# Patient Record
Sex: FEMALE | Race: WHITE | NOT HISPANIC OR LATINO | Employment: OTHER | URBAN - METROPOLITAN AREA
[De-identification: names, ages, dates, MRNs, and addresses within clinical notes are randomized per-mention and may not be internally consistent; named-entity substitution may affect disease eponyms.]

---

## 2017-02-03 ENCOUNTER — ALLSCRIPTS OFFICE VISIT (OUTPATIENT)
Dept: OTHER | Facility: OTHER | Age: 72
End: 2017-02-03

## 2017-12-13 ENCOUNTER — ALLSCRIPTS OFFICE VISIT (OUTPATIENT)
Dept: OTHER | Facility: OTHER | Age: 72
End: 2017-12-13
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
## 2018-01-12 VITALS
BODY MASS INDEX: 26.12 KG/M2 | WEIGHT: 153 LBS | RESPIRATION RATE: 14 BRPM | SYSTOLIC BLOOD PRESSURE: 134 MMHG | HEART RATE: 80 BPM | HEIGHT: 64 IN | OXYGEN SATURATION: 92 % | DIASTOLIC BLOOD PRESSURE: 72 MMHG

## 2018-01-13 NOTE — MISCELLANEOUS
Provider Comments  Provider Comments:   1st No show for Neurology: No call or message received  I called the patient and she stated that she completely forgot about the appointment due to a death in the family  Rescheduled patient to 2/3/17  Mailing 1st no show letter to the patients home today  Melinda Greer MD    Electronically signed by :  Sully Gonzalez DO; Dec 15 2016  2:48PM EST                       (Author)

## 2018-01-22 VITALS
HEART RATE: 78 BPM | SYSTOLIC BLOOD PRESSURE: 118 MMHG | BODY MASS INDEX: 26.46 KG/M2 | RESPIRATION RATE: 14 BRPM | DIASTOLIC BLOOD PRESSURE: 78 MMHG | HEIGHT: 64 IN | WEIGHT: 155 LBS

## 2018-02-07 DIAGNOSIS — M79.18 MUSCLE PAIN, LUMBAR: Primary | ICD-10-CM

## 2018-02-07 RX ORDER — MELOXICAM 7.5 MG/1
1 TABLET ORAL DAILY
COMMUNITY
Start: 2015-10-01 | End: 2018-02-07 | Stop reason: SDUPTHER

## 2018-02-07 RX ORDER — MELOXICAM 7.5 MG/1
7.5 TABLET ORAL DAILY
Qty: 90 TABLET | Refills: 3 | Status: SHIPPED | OUTPATIENT
Start: 2018-02-07 | End: 2019-04-29

## 2018-04-08 DIAGNOSIS — Z86.69 HISTORY OF MIGRAINE HEADACHES: Primary | ICD-10-CM

## 2018-04-09 RX ORDER — PROPRANOLOL HYDROCHLORIDE 120 MG/1
CAPSULE, EXTENDED RELEASE ORAL
Qty: 90 CAPSULE | Refills: 3 | Status: SHIPPED | OUTPATIENT
Start: 2018-04-09 | End: 2019-03-30 | Stop reason: SDUPTHER

## 2019-03-30 DIAGNOSIS — Z86.69 HISTORY OF MIGRAINE HEADACHES: ICD-10-CM

## 2019-04-01 RX ORDER — PROPRANOLOL HYDROCHLORIDE 120 MG/1
CAPSULE, EXTENDED RELEASE ORAL
Qty: 90 CAPSULE | Refills: 0 | Status: SHIPPED | OUTPATIENT
Start: 2019-04-01 | End: 2020-05-29 | Stop reason: ALTCHOICE

## 2019-04-29 ENCOUNTER — OFFICE VISIT (OUTPATIENT)
Dept: NEUROLOGY | Facility: CLINIC | Age: 74
End: 2019-04-29
Payer: MEDICARE

## 2019-04-29 VITALS
HEART RATE: 76 BPM | SYSTOLIC BLOOD PRESSURE: 128 MMHG | BODY MASS INDEX: 31.02 KG/M2 | DIASTOLIC BLOOD PRESSURE: 82 MMHG | WEIGHT: 158 LBS | HEIGHT: 60 IN

## 2019-04-29 DIAGNOSIS — R51.9 OCULAR HEADACHE: ICD-10-CM

## 2019-04-29 DIAGNOSIS — F41.9 ANXIETY: ICD-10-CM

## 2019-04-29 DIAGNOSIS — G52.1 GLOSSOPHARYNGEAL NEURALGIA SYNDROME: Primary | ICD-10-CM

## 2019-04-29 DIAGNOSIS — F41.9 ANXIETY: Primary | ICD-10-CM

## 2019-04-29 PROCEDURE — 99214 OFFICE O/P EST MOD 30 MIN: CPT | Performed by: NURSE PRACTITIONER

## 2019-04-29 RX ORDER — METRONIDAZOLE 10 MG/G
GEL TOPICAL
COMMUNITY
Start: 2013-08-23

## 2019-04-29 RX ORDER — ALPRAZOLAM 0.25 MG/1
0.25 TABLET ORAL
Qty: 20 TABLET | Refills: 0 | Status: SHIPPED | OUTPATIENT
Start: 2019-04-29 | End: 2020-05-29 | Stop reason: ALTCHOICE

## 2019-04-29 RX ORDER — ALBUTEROL SULFATE 90 UG/1
2 AEROSOL, METERED RESPIRATORY (INHALATION) EVERY 4 HOURS PRN
COMMUNITY

## 2019-04-29 RX ORDER — OMEPRAZOLE 40 MG/1
CAPSULE, DELAYED RELEASE ORAL
Refills: 1 | COMMUNITY
Start: 2019-04-15

## 2019-04-29 RX ORDER — ESOMEPRAZOLE MAGNESIUM 40 MG/1
CAPSULE, DELAYED RELEASE ORAL
COMMUNITY

## 2019-04-29 RX ORDER — SIMVASTATIN 40 MG
TABLET ORAL
COMMUNITY
Start: 2014-10-20 | End: 2021-12-07 | Stop reason: SDUPTHER

## 2019-04-29 RX ORDER — VENLAFAXINE HYDROCHLORIDE 75 MG/1
CAPSULE, EXTENDED RELEASE ORAL
COMMUNITY
Start: 2019-02-09 | End: 2021-12-07 | Stop reason: SDUPTHER

## 2019-04-29 RX ORDER — OMEPRAZOLE 40 MG/1
CAPSULE, DELAYED RELEASE ORAL
COMMUNITY
Start: 2018-11-09 | End: 2021-12-07 | Stop reason: SDUPTHER

## 2019-04-29 RX ORDER — SIMVASTATIN 40 MG
40 TABLET ORAL DAILY
Refills: 1 | COMMUNITY
Start: 2019-03-17

## 2019-04-29 RX ORDER — VENLAFAXINE HYDROCHLORIDE 75 MG/1
CAPSULE, EXTENDED RELEASE ORAL
COMMUNITY
Start: 2018-11-09

## 2019-10-31 ENCOUNTER — TELEPHONE (OUTPATIENT)
Dept: NEUROLOGY | Facility: CLINIC | Age: 74
End: 2019-10-31

## 2019-10-31 NOTE — TELEPHONE ENCOUNTER
Provider reschedule - patient moved from 4/29/20 to 5/29/20 @ 10:30 - [facility] - letter scanned into chart and sent in mail

## 2020-05-27 ENCOUNTER — TELEPHONE (OUTPATIENT)
Dept: NEUROLOGY | Facility: CLINIC | Age: 75
End: 2020-05-27

## 2020-05-29 ENCOUNTER — OFFICE VISIT (OUTPATIENT)
Dept: NEUROLOGY | Facility: CLINIC | Age: 75
End: 2020-05-29
Payer: MEDICARE

## 2020-05-29 VITALS
SYSTOLIC BLOOD PRESSURE: 142 MMHG | RESPIRATION RATE: 16 BRPM | HEART RATE: 70 BPM | WEIGHT: 157.4 LBS | BODY MASS INDEX: 30.74 KG/M2 | OXYGEN SATURATION: 95 % | TEMPERATURE: 98.2 F | DIASTOLIC BLOOD PRESSURE: 78 MMHG

## 2020-05-29 DIAGNOSIS — G52.1 GLOSSOPHARYNGEAL NEURALGIA SYNDROME: ICD-10-CM

## 2020-05-29 DIAGNOSIS — R51.9 OCULAR HEADACHE: Primary | ICD-10-CM

## 2020-05-29 DIAGNOSIS — F41.9 ANXIETY: ICD-10-CM

## 2020-05-29 PROCEDURE — 99213 OFFICE O/P EST LOW 20 MIN: CPT | Performed by: PSYCHIATRY & NEUROLOGY

## 2020-05-29 RX ORDER — UREA 10 %
1 LOTION (ML) TOPICAL EVERY 24 HOURS
COMMUNITY
Start: 2013-03-19

## 2020-05-29 RX ORDER — AMOXICILLIN 500 MG
CAPSULE ORAL
COMMUNITY
Start: 2013-03-19

## 2020-05-29 RX ORDER — METOPROLOL SUCCINATE 50 MG/1
50 TABLET, EXTENDED RELEASE ORAL DAILY
COMMUNITY
Start: 2020-05-21 | End: 2022-06-06

## 2021-05-27 ENCOUNTER — TELEPHONE (OUTPATIENT)
Dept: NEUROLOGY | Facility: CLINIC | Age: 76
End: 2021-05-27

## 2021-05-27 NOTE — TELEPHONE ENCOUNTER
I called and left a voicemail message about patients upcoming appointment with Corina Carroll on 6/2/21 @ 10:30 am  I requested a call back to our office if the patient has any issues or concerns or cannot keep this appointment

## 2021-06-02 ENCOUNTER — OFFICE VISIT (OUTPATIENT)
Dept: NEUROLOGY | Facility: CLINIC | Age: 76
End: 2021-06-02
Payer: MEDICARE

## 2021-06-02 VITALS
HEIGHT: 61 IN | HEART RATE: 96 BPM | SYSTOLIC BLOOD PRESSURE: 132 MMHG | DIASTOLIC BLOOD PRESSURE: 84 MMHG | BODY MASS INDEX: 30.17 KG/M2 | WEIGHT: 159.8 LBS | RESPIRATION RATE: 16 BRPM

## 2021-06-02 DIAGNOSIS — R51.9 OCULAR HEADACHE: ICD-10-CM

## 2021-06-02 DIAGNOSIS — G52.1 GLOSSOPHARYNGEAL NEURALGIA SYNDROME: ICD-10-CM

## 2021-06-02 DIAGNOSIS — S03.00XA TMJ (DISLOCATION OF TEMPOROMANDIBULAR JOINT): Primary | ICD-10-CM

## 2021-06-02 PROCEDURE — 99214 OFFICE O/P EST MOD 30 MIN: CPT | Performed by: PSYCHIATRY & NEUROLOGY

## 2021-06-02 NOTE — ASSESSMENT & PLAN NOTE
She does have a history of glossopharyngeal neuralgia which has significantly improved I wonder if these new symptoms may be a component of this or component of migraine  Another possibility is adding a low dose of gabapentin which may help both in the future    She is to contact our offices accordingly in several weeks after she has pursued conservative measures and further medications could be adjusted
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
## 2021-06-02 NOTE — ASSESSMENT & PLAN NOTE
sHe does have a history of ocular migraines and these current occur intermittently  She does now complain of achy throbbing sensation around her ear with pain which can radiate to below right ear  She has been previously suggested that she may have TMJ and she was asked to obtain a mouth guard  I did recommend a mouth guard  I also recommended that she pursue physical therapy with exercise  She was quite reluctant  I have informed her that this can be quite helpful  If these  Are unsuccessful we could also consider slightly increasing the Effexor  I would not add another beta-blocker she is already on one and has a pacemaker

## 2021-06-07 ENCOUNTER — EVALUATION (OUTPATIENT)
Dept: PHYSICAL THERAPY | Facility: REHABILITATION | Age: 76
End: 2021-06-07
Payer: MEDICARE

## 2021-06-07 DIAGNOSIS — S03.00XA DISLOCATION OF TEMPOROMANDIBULAR JOINT, INITIAL ENCOUNTER: ICD-10-CM

## 2021-06-07 DIAGNOSIS — S03.00XA TMJ (DISLOCATION OF TEMPOROMANDIBULAR JOINT): ICD-10-CM

## 2021-06-07 PROCEDURE — 97110 THERAPEUTIC EXERCISES: CPT | Performed by: PHYSICAL MEDICINE & REHABILITATION

## 2021-06-07 PROCEDURE — 97161 PT EVAL LOW COMPLEX 20 MIN: CPT | Performed by: PHYSICAL MEDICINE & REHABILITATION

## 2021-06-07 NOTE — PROGRESS NOTES
PT Evaluation     Today's date: 2021  Patient name: Alexander Francis  : 1945  MRN: [id number]  Referring provider: Rosie Estrada DO  Dx:   Encounter Diagnosis     ICD-10-CM    1  TMJ (dislocation of temporomandibular joint)  S03  00XA Ambulatory referral to Physical Therapy   2  Dislocation of temporomandibular joint, initial encounter  S03  00XA        Start Time: 3537  Stop Time: 1600  Total time in clinic (min): 45 minutes    Assessment  Assessment details: Pt is a 68 y o  female presenting to outpatient physical therapy with TMJ (dislocation of temporomandibular joint)   Pt presents with pain, decreased range of motion, decreased strength, and decreased tolerance to activity  Pt would benefit from skilled physical therapy to address limitations and to achieve goals  Thank you for this referral    Impairments: abnormal muscle tone, impaired physical strength and lacks appropriate home exercise program    Symptom irritability: highUnderstanding of Dx/Px/POC: good   Prognosis: good    Goals  ST  Patient will report 25% decrease in pain in 4 weeks  2  Patient will demonstrate 25% improvement in ROM in 4 weeks  3  Patient will demonstrate 1/2 grade improvement in strength in 4 weeks  LT  Patient will be able to perform IADLS without restriction or pain by discharge  2  Patient will be independent in HEP by discharge  3  Patient will report reduction in frequency and intensity of pain by DC         Plan  Patient would benefit from: skilled physical therapy and PT eval  Planned modality interventions: biofeedback, cryotherapy and thermotherapy: hydrocollator packs  Planned therapy interventions: abdominal trunk stabilization, joint mobilization, manual therapy, neuromuscular re-education, patient education, postural training, strengthening, stretching, therapeutic exercise, therapeutic activities and home exercise program  Frequency: 2x week  Duration in weeks: 4  Treatment plan discussed with: patient        Subjective Evaluation    History of Present Illness  Mechanism of injury: Pt states that her sxs began approximately 1 year ago, she reports that her HA sxs occur once a month  She has experienced tongue numbness, ear and jaw pain  She occasionally will experience intense pain that will last for a few seconds  She also reports intermittent sensation of tongue swelling  Pain  Current pain ratin  At best pain ratin  At worst pain ratin  Quality: sharp  Progression: improved    Patient Goals  Patient goals for therapy: decreased pain          Objective     Palpation     Right   Hypertonic in the masseter  Tenderness of the masseter and temporalis     TMJ   Jaw observations: facial symmetry within normal limits  Joint sounds left: normal  Joint sounds right: normal  Lateral bite test, Left: no pain  Lateral bite test, right: pain on right  Opening (mm): within normal limits   Lateral excursion, left (mm): within normal limits  Lateral excursion, right (mm)t: within normal limits   Protrusion (mm): within normal limits       Flowsheet Rows      Most Recent Value   PT/OT G-Codes   Current Score  63   Projected Score  65             Precautions: Hx of ocular headache, anxiety      Manuals             R masseter STM             Assess need for joint mobilizations                                       Neuro Re-Ed                                                                                                        Ther Ex             UBE             Protrusion iso             Lateral deviation iso B             TB row             TB horizontal ABD             Corner stretch             Open books                          Ther Activity                                       Gait Training                                       Modalities

## 2021-06-15 ENCOUNTER — OFFICE VISIT (OUTPATIENT)
Dept: PHYSICAL THERAPY | Facility: REHABILITATION | Age: 76
End: 2021-06-15
Payer: MEDICARE

## 2021-06-15 DIAGNOSIS — S03.00XD DISLOCATION OF TEMPOROMANDIBULAR JOINT, SUBSEQUENT ENCOUNTER: Primary | ICD-10-CM

## 2021-06-15 PROCEDURE — 97110 THERAPEUTIC EXERCISES: CPT

## 2021-06-15 PROCEDURE — 97140 MANUAL THERAPY 1/> REGIONS: CPT

## 2021-06-15 NOTE — PROGRESS NOTES
Daily Note     Today's date: 6/15/2021  Patient name: Juan A Preciado  : 1945  MRN: [id number]  Referring provider: Flower Mcclure DO  Dx:   Encounter Diagnosis     ICD-10-CM    1  Dislocation of temporomandibular joint, subsequent encounter  S03  00XD                   Subjective:  Patient reports compliance with performing HEP without issue  Patient denied pain upon entry  Objective: See treatment diary below  Issued a written HEP for corner pec stretch  Assessment: Tolerated treatment well  Introduced postural strengthening with good patient tolerance  Patient has a pre exiting R shoulder injury and was encouraged to perform ex within her tolerance and to modify pec corner stretch arm height on the R if needed  Patient denied pain during ex performed  Patient exhibited good technique with therapeutic exercises and would benefit from continued PT to attain set goals  Plan: Continue per plan of care        Precautions: Hx of ocular headache, anxiety      Manuals 6/15            R masseter STM NT            Assess need for joint mobilizations                                       Neuro Re-Ed                                                                                                        Ther Ex             UBE 2'/2'            Protrusion iso 5"x10            Lateral deviation iso B 5"x10            TB row YTB 3"x15            TB horizontal ABD YTB 3" x15            Corner stretch 30"x3            Open books                          Ther Activity                                       Gait Training                                       Modalities Complex Repair Preamble Text (Leave Blank If You Do Not Want): Extensive wide undermining was performed.

## 2021-06-17 ENCOUNTER — OFFICE VISIT (OUTPATIENT)
Dept: PHYSICAL THERAPY | Facility: REHABILITATION | Age: 76
End: 2021-06-17
Payer: MEDICARE

## 2021-06-17 DIAGNOSIS — S03.00XD DISLOCATION OF TEMPOROMANDIBULAR JOINT, SUBSEQUENT ENCOUNTER: Primary | ICD-10-CM

## 2021-06-17 PROCEDURE — 97140 MANUAL THERAPY 1/> REGIONS: CPT | Performed by: PHYSICAL MEDICINE & REHABILITATION

## 2021-06-17 PROCEDURE — 97110 THERAPEUTIC EXERCISES: CPT | Performed by: PHYSICAL MEDICINE & REHABILITATION

## 2021-06-17 NOTE — PROGRESS NOTES
Daily Note     Today's date: 2021  Patient name: Elvia Mcintyre  : 1945  MRN: [id number]  Referring provider: Shane Harry DO  Dx:   Encounter Diagnosis     ICD-10-CM    1  Dislocation of temporomandibular joint, subsequent encounter  S03  00XD        Start Time: 1030  Stop Time: 1115  Total time in clinic (min): 45 minutes    Subjective: Pt states that there has been no change in her condition, she had one episode of shooting pain since last visit  Objective: See treatment diary below      Assessment: Tolerated treatment well  Patient demonstrated fatigue post treatment, exhibited good technique with therapeutic exercises and would benefit from continued PT  TTP R SCM origin and into the muscle belly  Plan: Progress treatment as tolerated         Precautions: Hx of ocular headache, anxiety      Manuals 6/15 6/17           R masseter STM NC NC  + SCM           Assess need for joint mobilizations             Seated SCM stretch  NC                        Neuro Re-Ed                                                                                                        Ther Ex             UBE 2'/2' 2'/2'           Protrusion iso 5"x10            Lateral deviation iso B 5"x10            TB row YTB 3"x15 YTB  20           TB horizontal ABD YTB 3" x15 YTB  15           Corner stretch 30"x3 5 x 20"           Open books                          Ther Activity                                       Gait Training                                       Modalities

## 2021-06-22 ENCOUNTER — OFFICE VISIT (OUTPATIENT)
Dept: PHYSICAL THERAPY | Facility: REHABILITATION | Age: 76
End: 2021-06-22
Payer: MEDICARE

## 2021-06-22 DIAGNOSIS — S03.00XD DISLOCATION OF TEMPOROMANDIBULAR JOINT, SUBSEQUENT ENCOUNTER: Primary | ICD-10-CM

## 2021-06-22 PROCEDURE — 97140 MANUAL THERAPY 1/> REGIONS: CPT | Performed by: PHYSICAL MEDICINE & REHABILITATION

## 2021-06-22 PROCEDURE — 97110 THERAPEUTIC EXERCISES: CPT | Performed by: PHYSICAL MEDICINE & REHABILITATION

## 2021-06-22 NOTE — PROGRESS NOTES
Daily Note     Today's date: 2021  Patient name: Claribel Camargo  : 1945  MRN: [id number]  Referring provider: Yulissa Silva DO  Dx:   Encounter Diagnosis     ICD-10-CM    1  Dislocation of temporomandibular joint, subsequent encounter  S03  00XD        Start Time: 0945  Stop Time: 1030  Total time in clinic (min): 45 minutes    Subjective: Pt reports reduction in sxs following last visit, she denies sxs currently  Objective: See treatment diary below      Assessment: Tolerated treatment well  Patient demonstrated fatigue post treatment, exhibited good technique with therapeutic exercises and would benefit from continued PT  TTP  R SCM muscle belly  Improved L cervical rotation and SB following tx  Plan: Progress treatment as tolerated         Precautions: Hx of ocular headache, anxiety      Manuals 6/15 6/17 6/22          R masseter STM NC NC  + SCM NC  SCM only          Assess need for joint mobilizations              SCM stretch  NC  seated NC  supine                       Neuro Re-Ed                                                                                                        Ther Ex             UBE 2'/2' 2'/2' 3'/3'          Protrusion iso 5"x10            Lateral deviation iso B 5"x10            TB row YTB 3"x15 YTB  20           TB horizontal ABD YTB 3" x15 YTB  15           Corner stretch 30"x3 5 x 20"           UT stretch    3 x 30"          Open books   10 x 5"          Supine pec stretch    2 x 60"          Ther Activity                                       Gait Training                                       Modalities

## 2021-11-19 ENCOUNTER — TELEPHONE (OUTPATIENT)
Dept: NEUROLOGY | Facility: CLINIC | Age: 76
End: 2021-11-19

## 2021-11-19 NOTE — TELEPHONE ENCOUNTER
"Called pt's PCP, office staff said the doctor hasn't signed off on the office visit note yet. But once she does we should see it in her chart ( Care Everywhere)    Called pt to get more info on her numbness issue.    She stated she had it \" for a few days and it went away. It's fine now.\" Denies any difficulty swallowing or any other symptoms at this time.    Doesn't wish to come in earlier than her Dec. Appt.    I instructed her to call us if the numbness comes back or worsens or any new symptoms.    Pt agreed.    "

## 2021-11-19 NOTE — TELEPHONE ENCOUNTER
Please get the records from the pcp    She has an appointment with My in December. If Terry has any opening prior , please offer her an appointment

## 2021-11-19 NOTE — TELEPHONE ENCOUNTER
Clementine'd call from pt's PCP's office. Pt was there for a visit today for her Right sided sciatica.    RN said PCP asked to call our office to inform you pt reported at today's visit that she has intermittent Numbness of her tongue and wanted to make Neurology aware.    If you wish to speak with PCP  Dr. Palma Mosqueda    [facility]# [phone]

## 2021-12-07 ENCOUNTER — OFFICE VISIT (OUTPATIENT)
Dept: NEUROLOGY | Facility: CLINIC | Age: 76
End: 2021-12-07
Payer: MEDICARE

## 2021-12-07 VITALS
WEIGHT: 160.4 LBS | BODY MASS INDEX: 30.29 KG/M2 | SYSTOLIC BLOOD PRESSURE: 124 MMHG | HEART RATE: 74 BPM | DIASTOLIC BLOOD PRESSURE: 67 MMHG | TEMPERATURE: 97.2 F | HEIGHT: 61 IN

## 2021-12-07 DIAGNOSIS — R51.9 OCULAR HEADACHE: ICD-10-CM

## 2021-12-07 DIAGNOSIS — R20.2 PARESTHESIA: ICD-10-CM

## 2021-12-07 DIAGNOSIS — G52.1 GLOSSOPHARYNGEAL NEURALGIA SYNDROME: Primary | ICD-10-CM

## 2021-12-07 PROCEDURE — 99214 OFFICE O/P EST MOD 30 MIN: CPT | Performed by: NURSE PRACTITIONER

## 2022-04-25 ENCOUNTER — HOSPITAL ENCOUNTER (OUTPATIENT)
Dept: RADIOLOGY | Facility: IMAGING CENTER | Age: 77
Discharge: HOME | End: 2022-04-25
Payer: MEDICARE

## 2022-04-25 DIAGNOSIS — J45.991 COUGH VARIANT ASTHMA: ICD-10-CM

## 2022-04-25 PROCEDURE — 71046 X-RAY EXAM CHEST 2 VIEWS: CPT

## 2022-05-17 ENCOUNTER — TELEPHONE (OUTPATIENT)
Dept: NEUROLOGY | Facility: CLINIC | Age: 77
End: 2022-05-17

## 2022-05-17 NOTE — TELEPHONE ENCOUNTER
Called and spoke to patient  Patient confirmed upcoming apt with Dr Ever Sahni on 6/6/22 @ 10:30 am  Patient has no issues or concerns at this time

## 2022-06-06 ENCOUNTER — OFFICE VISIT (OUTPATIENT)
Dept: NEUROLOGY | Facility: CLINIC | Age: 77
End: 2022-06-06
Payer: MEDICARE

## 2022-06-06 VITALS
HEIGHT: 61 IN | WEIGHT: 162 LBS | TEMPERATURE: 95.2 F | DIASTOLIC BLOOD PRESSURE: 88 MMHG | HEART RATE: 70 BPM | RESPIRATION RATE: 18 BRPM | SYSTOLIC BLOOD PRESSURE: 132 MMHG | OXYGEN SATURATION: 97 % | BODY MASS INDEX: 30.58 KG/M2

## 2022-06-06 DIAGNOSIS — G52.1 GLOSSOPHARYNGEAL NEURALGIA SYNDROME: Primary | ICD-10-CM

## 2022-06-06 PROCEDURE — 99213 OFFICE O/P EST LOW 20 MIN: CPT | Performed by: PSYCHIATRY & NEUROLOGY

## 2022-06-06 NOTE — PROGRESS NOTES
Patient ID: Jose Alamo is a 68 y o  female  Assessment/Plan:    Glossopharyngeal neuralgia syndrome  She has a known history of glossopharyngeal neuralgia  She is currently on Effexor 75 mg a day and Toprol which is managed by cardiologist   With acupuncture treatments treatments her symptoms have improved    At this point will not change any of her medications  She can return to our offices in one year    Ocular headache  Well controlled       Diagnoses and all orders for this visit:    Glossopharyngeal neuralgia syndrome    Other orders  -     Multiple Vitamins-Minerals (VITAMIN D3 COMPLETE PO); Take by mouth daily  -     MAGNESIUM PO; Take by mouth daily         Subjective:    Doug Wang is a 60-year-old female who presents today for neurologic follow-up for ocular migraines and glossopharyngeal neuralgia       She is doing well  She does have a history of ocular may migraines are to well controlled  At the last visit several months ago she was complaining of neuralgia type symptoms occurred two or 3 times a week occurring on the right side of her head with numbness of her tongue  It was felt that the numbness of the tongue can related to the neuralgia  She has undergone acupuncture which has helped to reduce the intensity  The symptoms can occur but they do not proceed to be in increase in intensity       Physical therapy was treatment of TMJ provided no benefit            Patient presents today with complaints of numbness and tingling in her hands bilaterally, more prominent on the left  This seems to occurred when she is using her iPad as well as when she is on the motorcycle  She does note some dropping of objects  No hand weakness         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  Patient noted to have PAF and near-syncope, Holter noted AFib/flutter, she had PPM placed with no recurrence  She remains on Toprol as well as Eliquis                The following portions of the patient's history were reviewed and updated as appropriate:   She  has no past medical history on file  She  has no past surgical history on file  Her family history includes Cancer in her mother; Emphysema in her brother and father  She  reports that she has never smoked  She has never used smokeless tobacco  She reports that she does not drink alcohol and does not use drugs  Current Outpatient Medications   Medication Sig Dispense Refill    albuterol (PROVENTIL HFA,VENTOLIN HFA) 90 mcg/act inhaler Inhale 2 puffs every 4 (four) hours as needed      apixaban (ELIQUIS) 5 mg Take 5 mg by mouth 2 (two) times a day      esomeprazole (NexIUM) 40 MG capsule Take by mouth      fluticasone (ARNUITY ELLIPTA) 200 MCG/ACT AEPB inhaler Inhale      MAGNESIUM PO Take by mouth daily      metoprolol succinate (TOPROL-XL) 50 mg 24 hr tablet Take 50 mg by mouth daily      metroNIDAZOLE (METROGEL) 1 % gel use as directed      Multiple Vitamins-Minerals (VITAMIN D3 COMPLETE PO) Take by mouth daily      Omega-3 Fatty Acids (FISH OIL) 1200 MG CAPS 1 po every day      omeprazole (PriLOSEC) 40 MG capsule TAKE ONE CAPSULE BY MOUTH EVERY DAY AS NEEDED FOR ACID REFLUX  1    simvastatin (ZOCOR) 40 mg tablet Take 40 mg by mouth daily  1    venlafaxine (EFFEXOR-XR) 75 mg 24 hr capsule TAKE 1 CAPSULE NIGHTLY      VITAMIN D PO take 1 by Oral route  every day      folic acid (FOLVITE) 172 MCG tablet 1 tablet every 24 hours (Patient not taking: No sig reported)       No current facility-administered medications for this visit  She has No Known Allergies            Objective:    Blood pressure 132/88, pulse 70, temperature (!) 95 2 °F (35 1 °C), temperature source Tympanic, resp  rate 18, height 5' 1" (1 549 m), weight 73 5 kg (162 lb), SpO2 97 %  Physical Exam  Eyes:      General: Lids are normal       Extraocular Movements: Extraocular movements intact        Pupils: Pupils are equal, round, and reactive to light  Neurological:      Deep Tendon Reflexes: Strength normal       Reflex Scores:       Tricep reflexes are 1+ on the right side and 1+ on the left side  Bicep reflexes are 2+ on the right side and 2+ on the left side  Patellar reflexes are 2+ on the right side and 2+ on the left side  Achilles reflexes are 1+ on the right side and 1+ on the left side  Neurological Exam  Mental Status  Awake, alert and oriented to person, place and time  Oriented to person, place and time  Language is fluent with no aphasia  Cranial Nerves  CN II: Visual acuity is normal  Visual fields full to confrontation  CN III, IV, VI: Extraocular movements intact bilaterally  Normal lids and orbits bilaterally  Pupils equal round and reactive to light bilaterally  CN V: Facial sensation is normal   CN VII: Full and symmetric facial movement  CN VIII: Hearing is normal   CN IX, X: Palate elevates symmetrically  Normal gag reflex  CN XI: Shoulder shrug strength is normal   CN XII: Tongue midline without atrophy or fasciculations  Motor  Normal muscle bulk throughout  No fasciculations present  Strength is 5/5 throughout all four extremities  Sensory  Decrease in sensation on the right side of her face  Reflexes                                            Right                      Left  Biceps                                 2+                         2+  Triceps                                1+                         1+  Patellar                                2+                         2+  Achilles                                1+                         1+  Plantar                           Downgoing                Downgoing    Coordination  Right: Finger-to-nose normal Left: Finger-to-nose normal     Gait   Able to rise from chair without using arms      Review of systems obtained from the medical assistant as below was reviewed with the patient at today's visit  ROS:    Review of Systems   Constitutional: Negative  Negative for appetite change and fever  HENT: Negative  Negative for hearing loss, tinnitus, trouble swallowing and voice change  Eyes: Negative  Negative for photophobia and pain  Respiratory: Negative  Negative for shortness of breath  Cardiovascular: Negative  Negative for palpitations  Gastrointestinal: Negative  Negative for nausea and vomiting  Endocrine: Negative  Negative for cold intolerance  Genitourinary: Negative  Negative for dysuria, frequency and urgency  Musculoskeletal: Negative  Negative for myalgias and neck pain  Skin: Negative  Negative for rash  Allergic/Immunologic: Negative  Neurological: Negative  Negative for dizziness, tremors, seizures, syncope, facial asymmetry, speech difficulty, weakness, light-headedness, numbness and headaches  Hematological: Bruises/bleeds easily  Psychiatric/Behavioral: Negative  Negative for confusion, hallucinations and sleep disturbance

## 2022-06-06 NOTE — ASSESSMENT & PLAN NOTE
She has a known history of glossopharyngeal neuralgia  She is currently on Effexor 75 mg a day and Toprol which is managed by cardiologist   With acupuncture treatments treatments her symptoms have improved    At this point will not change any of her medications    She can return to our offices in one year

## 2023-06-01 ENCOUNTER — TELEPHONE (OUTPATIENT)
Dept: NEUROLOGY | Facility: CLINIC | Age: 78
End: 2023-06-01

## 2023-06-01 NOTE — TELEPHONE ENCOUNTER
Called and spoke to patient   Patient confirmed upcoming apt with Dr Parnell Reason on 6/7/23 @ 10:30 am

## 2023-06-07 ENCOUNTER — OFFICE VISIT (OUTPATIENT)
Dept: NEUROLOGY | Facility: CLINIC | Age: 78
End: 2023-06-07
Payer: MEDICARE

## 2023-06-07 VITALS
OXYGEN SATURATION: 96 % | SYSTOLIC BLOOD PRESSURE: 140 MMHG | TEMPERATURE: 96.1 F | BODY MASS INDEX: 30.89 KG/M2 | WEIGHT: 163.6 LBS | RESPIRATION RATE: 18 BRPM | DIASTOLIC BLOOD PRESSURE: 69 MMHG | HEART RATE: 62 BPM | HEIGHT: 61 IN

## 2023-06-07 DIAGNOSIS — G52.1 GLOSSOPHARYNGEAL NEURALGIA SYNDROME: Primary | ICD-10-CM

## 2023-06-07 DIAGNOSIS — R20.2 PARESTHESIA: ICD-10-CM

## 2023-06-07 PROCEDURE — 99213 OFFICE O/P EST LOW 20 MIN: CPT | Performed by: PSYCHIATRY & NEUROLOGY

## 2023-06-07 RX ORDER — METHOCARBAMOL 500 MG/1
500 TABLET, FILM COATED ORAL AS NEEDED
COMMUNITY
Start: 2023-05-25

## 2023-06-07 NOTE — ASSESSMENT & PLAN NOTE
She complains of paresthesias involving her left hand  It is worse after riding a motorcycle and with activity she did have a positive phalanx and a positive Tinel's sign  I am asked her  utilize a brace at night  If the symptoms do increase she may need a referral to orthopedics and or an EMG study  At this point she would like to hold off  She is to return to our offices in 1 year

## 2023-06-07 NOTE — PROGRESS NOTES
Patient ID: Erin Sanchez is a 66 y o  female  Assessment/Plan:    Glossopharyngeal neuralgia syndrome  Darylene Gutta is doing well  She currently utilizes Effexor 75 mg a day  She is getting acupuncture treatments every 3 weeks with control of her symptoms  Initially the acupuncture she was getting every week and then now it is every 3 weeks  If her symptoms do increase in intensity we could even try higher doses of Effexor  Paresthesia  She complains of paresthesias involving her left hand  It is worse after riding a motorcycle and with activity she did have a positive phalanx and a positive Tinel's sign  I am asked her  utilize a brace at night  If the symptoms do increase she may need a referral to orthopedics and or an EMG study  At this point she would like to hold off  She is to return to our offices in 1 year  Diagnoses and all orders for this visit:    Glossopharyngeal neuralgia syndrome    Paresthesia    Other orders  -     methocarbamol (ROBAXIN) 500 mg tablet; Take 500 mg by mouth as needed         Subjective:    Danyel Barrios is a 78-year-old female who presents today for neurologic follow-up for ocular migraines and glossopharyngeal neuralgia       She is doing well  She does have a history of ocular may migraines are to well controlled  At the last visit several months ago she was complaining of neuralgia type symptoms occurred two or 3 times a week occurring on the right side of her head with numbness of her tongue  It was felt that the numbness of the tongue can related to the neuralgia  She is now receiving acupuncture with significant benefit of her symptoms  The acupuncture is received every 3 weeks  This has helped her neuropathic symptoms  She is no longer riding a motorcycle due to the fact that it exacerbated her neuropathic discomfort  She is currently on Effexor 75 mg a day          Today she also complains of numbness and tingling involving the left arm    It occurs after she is on the motorcycle for period of time and excessive activity can worsen her symptomatology  She denies any symptoms on the right  She denies any associated weakness or neck discomfort         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  Patient noted to have PAF and near-syncope, Holter noted AFib/flutter, she had PPM placed with no recurrence  She remains on Toprol as well as Eliquis  She recently had back spasms and was given a prescription for Robaxin                The following portions of the patient's history were reviewed and updated as appropriate:   She  has no past medical history on file  She  has no past surgical history on file  Her family history includes Cancer in her mother; Emphysema in her brother and father  She  reports that she has never smoked  She has never used smokeless tobacco  She reports that she does not drink alcohol and does not use drugs    Current Outpatient Medications   Medication Sig Dispense Refill   • albuterol (PROVENTIL HFA,VENTOLIN HFA) 90 mcg/act inhaler Inhale 2 puffs every 4 (four) hours as needed     • apixaban (ELIQUIS) 5 mg Take 5 mg by mouth 2 (two) times a day     • esomeprazole (NexIUM) 40 MG capsule Take by mouth     • fluticasone (ARNUITY ELLIPTA) 200 MCG/ACT AEPB inhaler Inhale     • MAGNESIUM PO Take by mouth daily     • methocarbamol (ROBAXIN) 500 mg tablet Take 500 mg by mouth as needed     • metoprolol succinate (TOPROL-XL) 50 mg 24 hr tablet Take 50 mg by mouth daily     • metroNIDAZOLE (METROGEL) 1 % gel use as directed     • Multiple Vitamins-Minerals (VITAMIN D3 COMPLETE PO) Take by mouth daily     • Omega-3 Fatty Acids (FISH OIL) 1200 MG CAPS 1 po every day     • omeprazole (PriLOSEC) 40 MG capsule TAKE ONE CAPSULE BY MOUTH EVERY DAY AS NEEDED FOR ACID REFLUX  1   • simvastatin (ZOCOR) 40 mg tablet Take 40 mg by mouth daily  1   • venlafaxine (EFFEXOR-XR) 75 mg 24 hr capsule TAKE 1 CAPSULE "NIGHTLY     • VITAMIN D PO take 1 by Oral route  every day     • folic acid (FOLVITE) 966 MCG tablet 1 tablet every 24 hours (Patient not taking: Reported on 12/7/2021)       No current facility-administered medications for this visit  She has No Known Allergies            Objective:    Blood pressure 140/69, pulse 62, temperature (!) 96 1 °F (35 6 °C), temperature source Tympanic, resp  rate 18, height 5' 0 5\" (1 537 m), weight 74 2 kg (163 lb 9 6 oz), SpO2 96 %  Physical Exam  Eyes:      General: Lids are normal       Extraocular Movements: Extraocular movements intact  Pupils: Pupils are equal, round, and reactive to light  Neurological:      Motor: Motor strength is normal      Deep Tendon Reflexes:      Reflex Scores:       Bicep reflexes are 2+ on the right side and 2+ on the left side  Patellar reflexes are 1+ on the right side and 1+ on the left side  Achilles reflexes are 1+ on the right side and 1+ on the left side  Psychiatric:         Speech: Speech normal          Neurological Exam  Mental Status   Oriented to person, place and time  Speech is normal  Language is fluent with no aphasia  Cranial Nerves  CN II: Visual acuity is normal  Visual fields full to confrontation  CN III, IV, VI: Extraocular movements intact bilaterally  Normal lids and orbits bilaterally  Pupils equal round and reactive to light bilaterally  CN V: Facial sensation is normal   CN VII:  Right: Taste is normal   CN VIII: Hearing is normal   CN IX, X: Palate elevates symmetrically  Normal gag reflex  CN XI: Shoulder shrug strength is normal   CN XII: Tongue midline without atrophy or fasciculations  Hyperesthesias in the right side of her neck below her jawline       Motor   Strength is 5/5 throughout all four extremities  He had a positive Tinel and a positive Phalen sign at the left wrist   No atrophy was noted and she had normal strength of the APB and opponens pollicis      Reflexes                 " Right                      Left  Biceps                                 2+                         2+  Patellar                                1+                         1+  Achilles                                1+                         1+  Right Plantar: downgoing  Left Plantar: downgoing    Coordination  Right: Finger-to-nose normal Left: Finger-to-nose normal     Gait  Normal casual, toe, heel and tandem gait  Able to rise from chair without using arms  Review of systems obtained from the medical assistant as below was reviewed with the patient at today's visit  ROS:    Review of Systems   Constitutional: Negative  Negative for appetite change and fever  HENT: Negative  Negative for hearing loss, tinnitus, trouble swallowing and voice change  Eyes: Negative  Negative for photophobia, pain and visual disturbance  Respiratory: Negative  Negative for shortness of breath  Cardiovascular: Negative  Negative for palpitations  Gastrointestinal: Positive for constipation and diarrhea  Negative for nausea and vomiting  Endocrine: Negative  Negative for cold intolerance  Genitourinary: Positive for frequency and urgency  Negative for dysuria  Musculoskeletal: Positive for back pain (low back pain)  Negative for myalgias and neck pain  Sciatica   Skin: Negative  Negative for rash  Allergic/Immunologic: Negative  Neurological: Positive for numbness (left hand at times)  Negative for dizziness, tremors, seizures, syncope, facial asymmetry, speech difficulty, weakness, light-headedness and headaches  Hematological: Bruises/bleeds easily  Psychiatric/Behavioral: Negative  Negative for confusion, hallucinations and sleep disturbance

## 2023-06-07 NOTE — ASSESSMENT & PLAN NOTE
Yash Lisas is doing well  She currently utilizes Effexor 75 mg a day  She is getting acupuncture treatments every 3 weeks with control of her symptoms  Initially the acupuncture she was getting every week and then now it is every 3 weeks  If her symptoms do increase in intensity we could even try higher doses of Effexor

## 2024-11-06 ENCOUNTER — OFFICE VISIT (OUTPATIENT)
Dept: NEUROLOGY | Facility: CLINIC | Age: 79
End: 2024-11-06
Payer: MEDICARE

## 2024-11-06 VITALS
WEIGHT: 157.6 LBS | SYSTOLIC BLOOD PRESSURE: 134 MMHG | TEMPERATURE: 98.5 F | HEIGHT: 61 IN | OXYGEN SATURATION: 97 % | DIASTOLIC BLOOD PRESSURE: 80 MMHG | HEART RATE: 72 BPM | BODY MASS INDEX: 29.76 KG/M2

## 2024-11-06 DIAGNOSIS — R51.9 OCULAR HEADACHE: ICD-10-CM

## 2024-11-06 DIAGNOSIS — R20.2 PARESTHESIA: ICD-10-CM

## 2024-11-06 DIAGNOSIS — G52.1 GLOSSOPHARYNGEAL NEURALGIA SYNDROME: Primary | ICD-10-CM

## 2024-11-06 PROCEDURE — 99212 OFFICE O/P EST SF 10 MIN: CPT | Performed by: PSYCHIATRY & NEUROLOGY

## 2024-11-06 NOTE — ASSESSMENT & PLAN NOTE
She does have paresthesias on the left hand and is more consistent with carpal tunnel syndrome.  She does some flattening of the left APB.  We discussed the need for EMG testing which she would like to hold off.  If she would like to proceed she is to contact me and we can be scheduled.  She does continue to utilize a wrist splint with benefit.

## 2024-11-06 NOTE — ASSESSMENT & PLAN NOTE
Stable.  She does have glossopharyngeal neuralgia that is well-controlled.  She is currently on Effexor 75 mg a day.

## 2024-11-06 NOTE — PROGRESS NOTES
Ambulatory Visit  Name: Daja Cota      : 1945      MRN: [id number]  Encounter Provider: Dena Han DO  Encounter Date: 2024   Encounter department: [facility] [place]    Assessment & Plan  Glossopharyngeal neuralgia syndrome  Stable.  She does have glossopharyngeal neuralgia that is well-controlled.  She is currently on Effexor 75 mg a day.         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         Paresthesia  She does have paresthesias on the left hand and is more consistent with carpal tunnel syndrome.  She does some flattening of the left APB.  We discussed the need for EMG testing which she would like to hold off.  If she would like to proceed she is to contact me and we can be scheduled.  She does continue to utilize a wrist splint with benefit.           Encouraged her to keep in contact with the cardiology department as she has many questions that are out of my realm of expertise.        Daja is a 79-year-old female who presents today for neurologic follow-up for ocular migraines and glossopharyngeal neuralgia.      She is doing well.  She does have a history of ocular may migraines are to well controlled.  At the last visit several months ago she was complaining of neuralgia type symptoms occurred two or 3 times a week occurring on the right side of her head with numbness of her tongue.  It was felt that the numbness of the tongue can related to the neuralgia.  She is now receiving acupuncture with significant benefit of her symptoms.  The acupuncture is received every 3 weeks.  This has helped her neuropathic symptoms.         She is currently on Effexor 75 mg a day           Today she also complains of numbness and tingling involving the left arm.  It occurs after she is on the motorcycle for period of time and excessive activity can worsen her symptomatology.  She denies any symptoms on the right.  She denies any  associated weakness or neck discomfort.  On occasion she will awaken with the symptoms.        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  Patient noted to have PAF and near-syncope, Holter noted AFib/flutter, she had PPM placed with no recurrence.  She remains on Toprol as well as Eliquis.    Scheduled for cardiac ablation soon.  She does have questions regarding some of her cardiac medications.    She also being treated for skin cancer.             Review of Systems   Constitutional: Negative.    HENT: Negative.     Eyes: Negative.    Respiratory: Negative.     Cardiovascular: Negative.    Gastrointestinal: Negative.    Endocrine: Negative.    Genitourinary: Negative.    Musculoskeletal: Negative.    Skin: Negative.    Allergic/Immunologic: Negative.    Neurological: Negative.    Hematological: Negative.    All other systems reviewed and are negative.    I have personally reviewed the MA's review of systems and made changes as necessary.    Medical History Reviewed by provider this encounter:       Current Outpatient Medications on File Prior to Visit   Medication Sig Dispense Refill    albuterol (PROVENTIL HFA,VENTOLIN HFA) 90 mcg/act inhaler Inhale 2 puffs every 4 (four) hours as needed      apixaban (ELIQUIS) 5 mg Take 5 mg by mouth 2 (two) times a day      esomeprazole (NexIUM) 40 MG capsule Take by mouth      fluticasone (ARNUITY ELLIPTA) 200 MCG/ACT AEPB inhaler Inhale      metoprolol succinate (TOPROL-XL) 50 mg 24 hr tablet Take 50 mg by mouth daily      metroNIDAZOLE (METROGEL) 1 % gel use as directed      Multiple Vitamins-Minerals (VITAMIN D3 COMPLETE PO) Take by mouth daily      omeprazole (PriLOSEC) 40 MG capsule TAKE ONE CAPSULE BY MOUTH EVERY DAY AS NEEDED FOR ACID REFLUX  1    simvastatin (ZOCOR) 40 mg tablet Take 40 mg by mouth daily  1    venlafaxine (EFFEXOR-XR) 75 mg 24 hr capsule TAKE 1 CAPSULE NIGHTLY      VITAMIN D PO take 1 by Oral route  every day       "folic acid (FOLVITE) 800 MCG tablet 1 tablet every 24 hours (Patient not taking: Reported on 12/7/2021)      MAGNESIUM PO Take by mouth daily      Omega-3 Fatty Acids (FISH OIL) 1200 MG CAPS 1 po every day      [DISCONTINUED] methocarbamol (ROBAXIN) 500 mg tablet Take 500 mg by mouth as needed       No current facility-administered medications on file prior to visit.      Social History     Tobacco Use    Smoking status: Never    Smokeless tobacco: Never   Vaping Use    Vaping status: Never Used   Substance and Sexual Activity    Alcohol use: Never    Drug use: Never    Sexual activity: Not on file     /80   Pulse 72   Temp 98.5 °F (36.9 °C) (Temporal)   Ht 5' 1\" (1.549 m)   Wt 71.5 kg (157 lb 9.6 oz)   SpO2 97%   BMI 29.78 kg/m²     Physical Exam  Neurological:      Mental Status: She is oriented to person, place, and time.      Cranial Nerves: Cranial nerves 2-12 are intact.      Motor: Motor strength is normal.     Coordination: Romberg Test normal.      Gait: Gait is intact.      Deep Tendon Reflexes:      Reflex Scores:       Patellar reflexes are 2+ on the right side and 2+ on the left side.       Achilles reflexes are 0 on the right side and 0 on the left side.      Neurologic Exam     Mental Status   Oriented to person, place, and time.   Level of consciousness: alert  Knowledge: good.     Cranial Nerves   Cranial nerves II through XII intact.     CN II   Visual fields full to confrontation.     Motor Exam   Overall muscle tone: normal    Strength   Strength 5/5 throughout. Did a Tinel's sign at the left wrist.  There is slight flattening of the left APB.     Sensory Exam   Light touch normal.     Gait, Coordination, and Reflexes     Gait  Gait: normal    Coordination   Romberg: negative    Tremor   Resting tremor: absent    Reflexes   Right patellar: 2+  Left patellar: 2+  Right achilles: 0  Left achilles: 0      I have spent a total time of 15 minutes in caring for this patient on the day of " the visit/encounter including Counseling / Coordination of care, Documenting in the medical record, Reviewing / ordering tests, medicine, procedures  , and Obtaining or reviewing history  .